# Patient Record
Sex: MALE
[De-identification: names, ages, dates, MRNs, and addresses within clinical notes are randomized per-mention and may not be internally consistent; named-entity substitution may affect disease eponyms.]

---

## 2019-07-09 ENCOUNTER — APPOINTMENT (OUTPATIENT)
Dept: OTOLARYNGOLOGY | Facility: CLINIC | Age: 34
End: 2019-07-09
Payer: COMMERCIAL

## 2019-07-09 VITALS
DIASTOLIC BLOOD PRESSURE: 77 MMHG | HEART RATE: 86 BPM | BODY MASS INDEX: 28.93 KG/M2 | HEIGHT: 66 IN | WEIGHT: 180 LBS | SYSTOLIC BLOOD PRESSURE: 117 MMHG

## 2019-07-09 DIAGNOSIS — Z78.9 OTHER SPECIFIED HEALTH STATUS: ICD-10-CM

## 2019-07-09 DIAGNOSIS — G47.9 SLEEP DISORDER, UNSPECIFIED: ICD-10-CM

## 2019-07-09 DIAGNOSIS — G47.33 OBSTRUCTIVE SLEEP APNEA (ADULT) (PEDIATRIC): ICD-10-CM

## 2019-07-09 DIAGNOSIS — Z99.89 OBSTRUCTIVE SLEEP APNEA (ADULT) (PEDIATRIC): ICD-10-CM

## 2019-07-09 PROCEDURE — 31575 DIAGNOSTIC LARYNGOSCOPY: CPT

## 2019-07-09 PROCEDURE — 99204 OFFICE O/P NEW MOD 45 MIN: CPT | Mod: 25

## 2019-07-09 RX ORDER — FLUOXETINE HYDROCHLORIDE 10 MG/1
10 CAPSULE ORAL
Qty: 30 | Refills: 0 | Status: ACTIVE | COMMUNITY
Start: 2019-07-02

## 2019-07-09 RX ORDER — ESCITALOPRAM OXALATE 5 MG/1
5 TABLET ORAL
Qty: 30 | Refills: 0 | Status: DISCONTINUED | COMMUNITY
Start: 2019-02-15

## 2019-07-09 RX ORDER — SODIUM CHLORIDE/ALOE VERA
SPRAY, NON-AEROSOL (ML) NASAL
Refills: 0 | Status: ACTIVE | COMMUNITY

## 2019-07-09 RX ORDER — TRIAMCINOLONE ACETONIDE 55 MCG
AEROSOL WITH ADAPTER (GRAM) NASAL
Refills: 0 | Status: ACTIVE | COMMUNITY

## 2019-07-09 NOTE — PHYSICAL EXAM
[de-identified] : narrow nose , ? valve collapse , responds to afrin [de-identified] : enlarged [de-identified] : long uvula [Normal] : orientation to person, place, and time: normal

## 2019-07-09 NOTE — REVIEW OF SYSTEMS
[Hearing Loss] : hearing loss [Seasonal Allergies] : seasonal allergies [Lightheadedness] : lightheadedness [Dizziness] : dizziness [Nasal Congestion] : nasal congestion [Recurrent Sinus Infections] : recurrent sinus infections [Nose Bleeds] : nose bleeds [Problem Snoring] : problem snoring [Sense Of Smell Problem] : sense of smell problem [Recent Weight Gain (___ Lbs)] : recent [unfilled] ~Ulb weight gain [Shortness Of Breath] : shortness of breath [Joint Pain] : joint pain [Negative] : Heme/Lymph

## 2019-07-09 NOTE — HISTORY OF PRESENT ILLNESS
[de-identified] : 33 year old male here for sleep apnea, nasal congestion, and nose bleeds.  States had a sleep study about a year ago, currently using CPAP.  States when has bad nasal congestion, unable to use the CPAP.  States sinus congestion.  States occasional use of Nasacort, Ayr with good results.  States sinus pain/pressure, post nasal drip, nasal discharge.  Denies sinus infections in the past year.  Denies recent cat scan of sinuses.   States has had nose bleeds in the past, especially when he was using Flonase.  States no longer using Flonase.  States now has blood on a tissue, not a full fledged nose bleed. unhappy with CPAP\par Does not have PSG results with him.

## 2019-07-09 NOTE — REASON FOR VISIT
[Initial Consultation] : an initial consultation for [Other: _____] : [unfilled] [FreeTextEntry2] : here for sleep apnea, nasal congestion, and nose bleeds